# Patient Record
Sex: MALE | Race: WHITE
[De-identification: names, ages, dates, MRNs, and addresses within clinical notes are randomized per-mention and may not be internally consistent; named-entity substitution may affect disease eponyms.]

---

## 2020-01-18 ENCOUNTER — HOSPITAL (OUTPATIENT)
Dept: OTHER | Age: 50
End: 2020-01-18

## 2020-01-18 PROCEDURE — 99283 EMERGENCY DEPT VISIT LOW MDM: CPT | Performed by: EMERGENCY MEDICINE

## 2020-01-20 ENCOUNTER — TELEPHONE (OUTPATIENT)
Dept: NEUROLOGY | Age: 50
End: 2020-01-20

## 2022-02-07 NOTE — PATIENT DISCUSSION
Small change to spec rx.  Patient  agreed that revised rx is improved but seems content with habitual specs.  Optional change.

## 2022-12-23 ENCOUNTER — NEW PATIENT (OUTPATIENT)
Dept: URBAN - METROPOLITAN AREA CLINIC 39 | Facility: CLINIC | Age: 52
End: 2022-12-23

## 2022-12-23 PROCEDURE — 92015 DETERMINE REFRACTIVE STATE: CPT

## 2022-12-23 PROCEDURE — 92004 COMPRE OPH EXAM NEW PT 1/>: CPT

## 2022-12-23 ASSESSMENT — VISUAL ACUITY
OU_SC: 20/20
OD_SC: J10
OS_SC: 20/20-1
OU_SC: J5
OD_CC: J3
OS_SC: J7
OU_CC: J1+
OS_CC: J2
OD_SC: 20/20

## 2022-12-23 ASSESSMENT — TONOMETRY
OD_IOP_MMHG: 17
OS_IOP_MMHG: 18

## 2023-01-20 ENCOUNTER — CONSULTATION/EVALUATION (OUTPATIENT)
Dept: URBAN - METROPOLITAN AREA CLINIC 39 | Facility: CLINIC | Age: 53
End: 2023-01-20

## 2023-01-20 DIAGNOSIS — H02.88A: ICD-10-CM

## 2023-01-20 DIAGNOSIS — H04.123: ICD-10-CM

## 2023-01-20 DIAGNOSIS — H02.88B: ICD-10-CM

## 2023-01-20 DIAGNOSIS — H25.13: ICD-10-CM

## 2023-01-20 PROCEDURE — 92286 ANT SGM IMG I&R SPECLR MIC: CPT

## 2023-01-20 PROCEDURE — 92136TC INTERFEROMETRY - TECHNICAL COMPONENT

## 2023-01-20 PROCEDURE — 99499RLE REFRACTIVE CONSULT/RLE

## 2023-01-20 PROCEDURE — 92025NC COMP. CORNEAL TOPO, UNI OR BILAT,

## 2023-01-20 PROCEDURE — V2799PMN IMPRIMIS PRED-MOXI-NEPAF 5ML

## 2023-01-20 PROCEDURE — 92134 CPTRZ OPH DX IMG PST SGM RTA: CPT

## 2023-01-20 PROCEDURE — 92250 FUNDUS PHOTOGRAPHY W/I&R: CPT

## 2023-01-20 ASSESSMENT — VISUAL ACUITY
OD_CC: J2
OS_SC: J7
OD_SC: J7
OD_SC: 20/20-1
OS_CC: J2
OS_SC: 20/20-1

## 2023-01-20 ASSESSMENT — TONOMETRY
OS_IOP_MMHG: 14
OD_IOP_MMHG: 16

## 2023-04-26 ENCOUNTER — POST-OP (OUTPATIENT)
Dept: URBAN - METROPOLITAN AREA CLINIC 39 | Facility: CLINIC | Age: 53
End: 2023-04-26

## 2023-04-26 DIAGNOSIS — Z96.1: ICD-10-CM

## 2023-04-26 PROCEDURE — 99024 POSTOP FOLLOW-UP VISIT: CPT

## 2023-04-26 ASSESSMENT — VISUAL ACUITY
OS_SC: J1
OD_SC: 20/20-1
OS_SC: 20/20
OU_SC: 20/10-2
OD_SC: J1+
OU_SC: J1+

## 2023-04-26 ASSESSMENT — TONOMETRY
OS_IOP_MMHG: 16
OD_IOP_MMHG: 15

## 2023-07-26 ENCOUNTER — ESTABLISHED PATIENT (OUTPATIENT)
Dept: URBAN - METROPOLITAN AREA CLINIC 39 | Facility: CLINIC | Age: 53
End: 2023-07-26

## 2023-07-26 DIAGNOSIS — Z96.1: ICD-10-CM

## 2023-07-26 DIAGNOSIS — H53.10: ICD-10-CM

## 2023-07-26 DIAGNOSIS — H02.88B: ICD-10-CM

## 2023-07-26 DIAGNOSIS — H02.88A: ICD-10-CM

## 2023-07-26 DIAGNOSIS — H04.123: ICD-10-CM

## 2023-07-26 PROCEDURE — 92014 COMPRE OPH EXAM EST PT 1/>: CPT

## 2023-07-26 PROCEDURE — 92015 DETERMINE REFRACTIVE STATE: CPT

## 2023-07-26 ASSESSMENT — TONOMETRY
OS_IOP_MMHG: 14
OD_IOP_MMHG: 14

## 2023-07-26 ASSESSMENT — VISUAL ACUITY
OU_SC: 20/10-2
OS_SC: J5
OD_SC: 20/15-2
OD_SC: J1
OS_SC: 20/15+2

## 2024-07-26 ENCOUNTER — COMPREHENSIVE EXAM (OUTPATIENT)
Dept: URBAN - METROPOLITAN AREA CLINIC 39 | Facility: CLINIC | Age: 54
End: 2024-07-26

## 2024-07-26 DIAGNOSIS — H02.88B: ICD-10-CM

## 2024-07-26 DIAGNOSIS — H53.10: ICD-10-CM

## 2024-07-26 DIAGNOSIS — Z96.1: ICD-10-CM

## 2024-07-26 DIAGNOSIS — H04.123: ICD-10-CM

## 2024-07-26 DIAGNOSIS — H02.88A: ICD-10-CM

## 2024-07-26 PROCEDURE — 92014 COMPRE OPH EXAM EST PT 1/>: CPT

## 2024-07-26 PROCEDURE — 92015 DETERMINE REFRACTIVE STATE: CPT

## 2024-07-26 ASSESSMENT — VISUAL ACUITY
OD_SC: 20/20+2
OS_SC: J7
OU_SC: J1+
OU_SC: 20/15
OD_SC: J1+
OS_SC: 20/15-2

## 2024-07-26 ASSESSMENT — TONOMETRY
OS_IOP_MMHG: 13
OD_IOP_MMHG: 14

## 2025-07-30 ENCOUNTER — COMPREHENSIVE EXAM (OUTPATIENT)
Age: 55
End: 2025-07-30

## 2025-07-30 DIAGNOSIS — H04.123: ICD-10-CM

## 2025-07-30 DIAGNOSIS — H02.88A: ICD-10-CM

## 2025-07-30 DIAGNOSIS — H02.88B: ICD-10-CM

## 2025-07-30 DIAGNOSIS — H53.10: ICD-10-CM

## 2025-07-30 DIAGNOSIS — Z96.1: ICD-10-CM

## 2025-07-30 PROCEDURE — 92015 DETERMINE REFRACTIVE STATE: CPT

## 2025-07-30 PROCEDURE — 92014 COMPRE OPH EXAM EST PT 1/>: CPT
